# Patient Record
Sex: MALE | Race: WHITE | NOT HISPANIC OR LATINO | Employment: FULL TIME | URBAN - METROPOLITAN AREA
[De-identification: names, ages, dates, MRNs, and addresses within clinical notes are randomized per-mention and may not be internally consistent; named-entity substitution may affect disease eponyms.]

---

## 2020-01-29 ENCOUNTER — OFFICE VISIT (OUTPATIENT)
Dept: SURGERY | Facility: CLINIC | Age: 60
End: 2020-01-29

## 2020-01-29 DIAGNOSIS — R10.32 LEFT GROIN PAIN: Primary | ICD-10-CM

## 2020-01-29 PROBLEM — E78.2 HYPERLIPIDEMIA, MIXED: Status: ACTIVE | Noted: 2018-07-16

## 2020-01-29 PROCEDURE — 99203 OFFICE O/P NEW LOW 30 MIN: CPT | Performed by: SURGERY

## 2020-01-29 RX ORDER — LISINOPRIL 40 MG/1
TABLET ORAL
COMMUNITY
Start: 2020-01-18

## 2020-01-29 RX ORDER — ATORVASTATIN CALCIUM 20 MG/1
TABLET, FILM COATED ORAL
COMMUNITY
Start: 2020-01-18

## 2020-01-29 RX ORDER — ALBUTEROL SULFATE 90 UG/1
2 AEROSOL, METERED RESPIRATORY (INHALATION)
COMMUNITY
Start: 2019-08-19 | End: 2020-01-29

## 2020-01-29 RX ORDER — NAPROXEN 500 MG/1
500 TABLET ORAL
COMMUNITY
Start: 2019-04-10

## 2020-01-29 NOTE — PROGRESS NOTES
Subjective   Erik Laird is a 59 y.o. male.     History of present illness  Mr. Laird is a pleasant 59-year-old seen in the office today at the request of his nurse practitioner for left groin pain.  It began after he was lifting some tires at work just recently.  He was started on an anti-inflammatory and went back to work and could not work because of the discomfort.  He was then asked to follow-up with us.    Is no change in his bladder habit or bowel habit associated with it.  It hurts when he walks or stands.    History reviewed. No pertinent past medical history.    Past Surgical History:   Procedure Laterality Date   • SHOULDER SURGERY Left        Outpatient Encounter Medications as of 1/29/2020   Medication Sig Dispense Refill   • atorvastatin (LIPITOR) 20 MG tablet      • lisinopril (PRINIVIL,ZESTRIL) 40 MG tablet      • metoprolol tartrate (LOPRESSOR) 25 MG tablet      • naproxen (NAPROSYN) 500 MG tablet Take 500 mg by mouth.     • [DISCONTINUED] albuterol sulfate  (90 Base) MCG/ACT inhaler Inhale 2 puffs.       No facility-administered encounter medications on file as of 1/29/2020.        No Known Allergies    History reviewed. No pertinent family history.    Social History     Socioeconomic History   • Marital status:      Spouse name: Not on file   • Number of children: Not on file   • Years of education: Not on file   • Highest education level: Not on file   Tobacco Use   • Smoking status: Former Smoker   • Smokeless tobacco: Never Used       The following portions of the patient's history were reviewed and updated as appropriate: allergies, current medications, past family history, past medical history, past social history, past surgical history and problem list.    Objective       Assessment/Plan   There are no diagnoses linked to this encounter.    All systems are reviewed and unremarkable with exception of the chief complaint.    Physical exam shows a pleasant 59-year-old male.   HEENT is negative.  Heart regular.  Lungs clear.  Abdomen is soft obese.  Left groin shows no obvious hernia bulge.  On Valsalva maneuvers I do not detect evidence of a defect.  However I explained to him that it is so early we may not feel an obvious hernia yet.    Extremities show equal range of motion in the upper and lower extremities.  He has symmetrical strength and usage.  Neuro shows no obvious focal deficit.    Impression: Left groin strain, possible early hernia    Recommendation: She has no light duty at work he needs to be off work for 3 weeks.  Recheck him in the office in 3 weeks.           Danny Pace, DO  1/29/2020  3:45 PM

## 2020-02-18 ENCOUNTER — OFFICE VISIT (OUTPATIENT)
Dept: SURGERY | Facility: CLINIC | Age: 60
End: 2020-02-18

## 2020-02-18 VITALS
HEIGHT: 67 IN | TEMPERATURE: 97.5 F | DIASTOLIC BLOOD PRESSURE: 71 MMHG | WEIGHT: 217 LBS | OXYGEN SATURATION: 94 % | HEART RATE: 73 BPM | SYSTOLIC BLOOD PRESSURE: 117 MMHG | BODY MASS INDEX: 34.06 KG/M2

## 2020-02-18 DIAGNOSIS — R10.32 LEFT GROIN PAIN: Primary | ICD-10-CM

## 2020-02-18 PROCEDURE — 99212 OFFICE O/P EST SF 10 MIN: CPT | Performed by: SURGERY

## 2020-02-18 NOTE — PROGRESS NOTES
Subjective   Erik Laird is a 59 y.o. male.     History of present illness  Mr. Laird is seen in the office today follow-up from a visit 3 weeks ago for possible hernia.  He works as a  and at our last visit we did not detect a hernia but felt he probably had a groin strain.  We asked that he stay on off work and let us recheck him in 3 weeks.  Is for that reason he presents today.  He states that the area still hurts but is much better.  Still no obvious swelling or bulging with coughing.        No past medical history on file.    Past Surgical History:   Procedure Laterality Date   • SHOULDER SURGERY Left        Outpatient Encounter Medications as of 2/18/2020   Medication Sig Dispense Refill   • atorvastatin (LIPITOR) 20 MG tablet      • lisinopril (PRINIVIL,ZESTRIL) 40 MG tablet      • metoprolol tartrate (LOPRESSOR) 25 MG tablet      • naproxen (NAPROSYN) 500 MG tablet Take 500 mg by mouth.       No facility-administered encounter medications on file as of 2/18/2020.        No Known Allergies    No family history on file.    Social History     Socioeconomic History   • Marital status:      Spouse name: Not on file   • Number of children: Not on file   • Years of education: Not on file   • Highest education level: Not on file   Tobacco Use   • Smoking status: Former Smoker   • Smokeless tobacco: Never Used       The following portions of the patient's history were reviewed and updated as appropriate: allergies, current medications, past family history, past medical history, past social history, past surgical history and problem list.    Objective       Assessment/Plan   There are no diagnoses linked to this encounter.    All systems are reviewed and unchanged from previous visit    Sickle exam today is limited to the system and hand.  We detect no evidence of hernia with Valsalva maneuvers.  He still has some tenderness in the left groin and the top of the leg in the inguinal/femoral  canal.    Impression: Groin strain    Plan: He may return to work but has a 20 pound lifting restriction for another 2 weeks           Danny Pace, DO  2/18/2020  10:17 AM

## 2020-03-09 ENCOUNTER — OFFICE VISIT (OUTPATIENT)
Dept: SURGERY | Facility: CLINIC | Age: 60
End: 2020-03-09

## 2020-03-09 VITALS
DIASTOLIC BLOOD PRESSURE: 76 MMHG | WEIGHT: 217.2 LBS | TEMPERATURE: 97.1 F | HEART RATE: 108 BPM | SYSTOLIC BLOOD PRESSURE: 115 MMHG | BODY MASS INDEX: 34.09 KG/M2 | HEIGHT: 67 IN | OXYGEN SATURATION: 95 %

## 2020-03-09 DIAGNOSIS — R10.32 LEFT GROIN PAIN: Primary | ICD-10-CM

## 2020-03-09 PROCEDURE — 99212 OFFICE O/P EST SF 10 MIN: CPT | Performed by: SURGERY

## 2020-03-09 NOTE — PROGRESS NOTES
Subjective   Erik Laird is a 59 y.o. male.     History of present illness  Mr. Laird is seen in the office today follow-up from his left groin pain.  Is now been 5 or 6 weeks since initially occurred.  He states that 99% of the pain is gone.  He has returned to work and is doing well.    History reviewed. No pertinent past medical history.    Past Surgical History:   Procedure Laterality Date   • SHOULDER SURGERY Left        Outpatient Encounter Medications as of 3/9/2020   Medication Sig Dispense Refill   • atorvastatin (LIPITOR) 20 MG tablet      • lisinopril (PRINIVIL,ZESTRIL) 40 MG tablet      • metoprolol tartrate (LOPRESSOR) 25 MG tablet      • naproxen (NAPROSYN) 500 MG tablet Take 500 mg by mouth.       No facility-administered encounter medications on file as of 3/9/2020.        No Known Allergies    History reviewed. No pertinent family history.    Social History     Socioeconomic History   • Marital status:      Spouse name: Not on file   • Number of children: Not on file   • Years of education: Not on file   • Highest education level: Not on file   Tobacco Use   • Smoking status: Former Smoker   • Smokeless tobacco: Never Used       The following portions of the patient's history were reviewed and updated as appropriate: allergies, current medications, past family history, past medical history, past social history, past surgical history and problem list.    Objective       Assessment/Plan   There are no diagnoses linked to this encounter.    All systems are reviewed and unchanged from previous visit.      Exam shows a pleasant 59-year-old male.  HEENT is negative.  Heart regular.  Lungs clear.  Abdomen soft nontender.  Left groin pain is essentially gone.  No evidence for hernia.    Impression: Left groin pain resolved    Plan: He is released from our care           Danny Pace DO  3/9/2020  1:18 PM